# Patient Record
Sex: FEMALE | Race: ASIAN | NOT HISPANIC OR LATINO | URBAN - METROPOLITAN AREA
[De-identification: names, ages, dates, MRNs, and addresses within clinical notes are randomized per-mention and may not be internally consistent; named-entity substitution may affect disease eponyms.]

---

## 2020-06-15 ENCOUNTER — EMERGENCY (EMERGENCY)
Facility: HOSPITAL | Age: 20
LOS: 1 days | Discharge: HOME | End: 2020-06-15
Attending: EMERGENCY MEDICINE | Admitting: EMERGENCY MEDICINE
Payer: MEDICAID

## 2020-06-15 VITALS
SYSTOLIC BLOOD PRESSURE: 100 MMHG | RESPIRATION RATE: 18 BRPM | HEART RATE: 76 BPM | OXYGEN SATURATION: 100 % | DIASTOLIC BLOOD PRESSURE: 68 MMHG

## 2020-06-15 VITALS
HEART RATE: 91 BPM | DIASTOLIC BLOOD PRESSURE: 79 MMHG | RESPIRATION RATE: 18 BRPM | SYSTOLIC BLOOD PRESSURE: 114 MMHG | OXYGEN SATURATION: 100 % | TEMPERATURE: 99 F

## 2020-06-15 LAB
ALBUMIN SERPL ELPH-MCNC: 4.3 G/DL — SIGNIFICANT CHANGE UP (ref 3.3–5)
ALP SERPL-CCNC: 80 U/L — SIGNIFICANT CHANGE UP (ref 40–120)
ALT FLD-CCNC: 13 U/L — SIGNIFICANT CHANGE UP (ref 4–33)
ANION GAP SERPL CALC-SCNC: 12 MMO/L — SIGNIFICANT CHANGE UP (ref 7–14)
APPEARANCE UR: CLEAR — SIGNIFICANT CHANGE UP
AST SERPL-CCNC: 42 U/L — HIGH (ref 4–32)
BACTERIA # UR AUTO: SIGNIFICANT CHANGE UP
BASOPHILS # BLD AUTO: 0.04 K/UL — SIGNIFICANT CHANGE UP (ref 0–0.2)
BASOPHILS NFR BLD AUTO: 0.7 % — SIGNIFICANT CHANGE UP (ref 0–2)
BILIRUB SERPL-MCNC: 0.2 MG/DL — SIGNIFICANT CHANGE UP (ref 0.2–1.2)
BILIRUB UR-MCNC: NEGATIVE — SIGNIFICANT CHANGE UP
BLOOD UR QL VISUAL: SIGNIFICANT CHANGE UP
BUN SERPL-MCNC: 12 MG/DL — SIGNIFICANT CHANGE UP (ref 7–23)
CALCIUM SERPL-MCNC: 9.1 MG/DL — SIGNIFICANT CHANGE UP (ref 8.4–10.5)
CHLORIDE SERPL-SCNC: 103 MMOL/L — SIGNIFICANT CHANGE UP (ref 98–107)
CO2 SERPL-SCNC: 20 MMOL/L — LOW (ref 22–31)
COLOR SPEC: SIGNIFICANT CHANGE UP
CREAT SERPL-MCNC: 0.65 MG/DL — SIGNIFICANT CHANGE UP (ref 0.5–1.3)
EOSINOPHIL # BLD AUTO: 0.13 K/UL — SIGNIFICANT CHANGE UP (ref 0–0.5)
EOSINOPHIL NFR BLD AUTO: 2.2 % — SIGNIFICANT CHANGE UP (ref 0–6)
GLUCOSE SERPL-MCNC: 91 MG/DL — SIGNIFICANT CHANGE UP (ref 70–99)
GLUCOSE UR-MCNC: NEGATIVE — SIGNIFICANT CHANGE UP
HCT VFR BLD CALC: 43.7 % — SIGNIFICANT CHANGE UP (ref 34.5–45)
HGB BLD-MCNC: 13.5 G/DL — SIGNIFICANT CHANGE UP (ref 11.5–15.5)
HYALINE CASTS # UR AUTO: NEGATIVE — SIGNIFICANT CHANGE UP
IMM GRANULOCYTES NFR BLD AUTO: 0.3 % — SIGNIFICANT CHANGE UP (ref 0–1.5)
KETONES UR-MCNC: NEGATIVE — SIGNIFICANT CHANGE UP
LEUKOCYTE ESTERASE UR-ACNC: SIGNIFICANT CHANGE UP
LYMPHOCYTES # BLD AUTO: 2.12 K/UL — SIGNIFICANT CHANGE UP (ref 1–3.3)
LYMPHOCYTES # BLD AUTO: 36.7 % — SIGNIFICANT CHANGE UP (ref 13–44)
MCHC RBC-ENTMCNC: 26.8 PG — LOW (ref 27–34)
MCHC RBC-ENTMCNC: 30.9 % — LOW (ref 32–36)
MCV RBC AUTO: 86.7 FL — SIGNIFICANT CHANGE UP (ref 80–100)
MONOCYTES # BLD AUTO: 0.57 K/UL — SIGNIFICANT CHANGE UP (ref 0–0.9)
MONOCYTES NFR BLD AUTO: 9.9 % — SIGNIFICANT CHANGE UP (ref 2–14)
NEUTROPHILS # BLD AUTO: 2.9 K/UL — SIGNIFICANT CHANGE UP (ref 1.8–7.4)
NEUTROPHILS NFR BLD AUTO: 50.2 % — SIGNIFICANT CHANGE UP (ref 43–77)
NITRITE UR-MCNC: NEGATIVE — SIGNIFICANT CHANGE UP
NRBC # FLD: 0 K/UL — SIGNIFICANT CHANGE UP (ref 0–0)
PH UR: 6.5 — SIGNIFICANT CHANGE UP (ref 5–8)
PLATELET # BLD AUTO: 241 K/UL — SIGNIFICANT CHANGE UP (ref 150–400)
PMV BLD: 10.5 FL — SIGNIFICANT CHANGE UP (ref 7–13)
POTASSIUM SERPL-MCNC: SIGNIFICANT CHANGE UP MMOL/L (ref 3.5–5.3)
POTASSIUM SERPL-SCNC: SIGNIFICANT CHANGE UP MMOL/L (ref 3.5–5.3)
PROT SERPL-MCNC: 7.5 G/DL — SIGNIFICANT CHANGE UP (ref 6–8.3)
PROT UR-MCNC: NEGATIVE — SIGNIFICANT CHANGE UP
RBC # BLD: 5.04 M/UL — SIGNIFICANT CHANGE UP (ref 3.8–5.2)
RBC # FLD: 14.8 % — HIGH (ref 10.3–14.5)
RBC CASTS # UR COMP ASSIST: SIGNIFICANT CHANGE UP (ref 0–?)
SODIUM SERPL-SCNC: 135 MMOL/L — SIGNIFICANT CHANGE UP (ref 135–145)
SP GR SPEC: 1.01 — SIGNIFICANT CHANGE UP (ref 1–1.04)
SQUAMOUS # UR AUTO: SIGNIFICANT CHANGE UP
UROBILINOGEN FLD QL: NORMAL — SIGNIFICANT CHANGE UP
WBC # BLD: 5.78 K/UL — SIGNIFICANT CHANGE UP (ref 3.8–10.5)
WBC # FLD AUTO: 5.78 K/UL — SIGNIFICANT CHANGE UP (ref 3.8–10.5)
WBC UR QL: HIGH (ref 0–?)

## 2020-06-15 PROCEDURE — 76830 TRANSVAGINAL US NON-OB: CPT | Mod: 26

## 2020-06-15 PROCEDURE — 99284 EMERGENCY DEPT VISIT MOD MDM: CPT

## 2020-06-15 RX ORDER — CEFPODOXIME PROXETIL 100 MG
1 TABLET ORAL
Qty: 14 | Refills: 0
Start: 2020-06-15 | End: 2020-06-21

## 2020-06-15 RX ORDER — CEFPODOXIME PROXETIL 100 MG
200 TABLET ORAL ONCE
Refills: 0 | Status: COMPLETED | OUTPATIENT
Start: 2020-06-15 | End: 2020-06-15

## 2020-06-15 RX ADMIN — Medication 200 MILLIGRAM(S): at 23:16

## 2020-06-15 NOTE — ED PROVIDER NOTE - NSFOLLOWUPINSTRUCTIONS_ED_ALL_ED_FT
Follow up with your PMD within 48-72 hours.      Take Cefpodoxime 200 mg 1 tab 2x/day for 7 days.      Increase fluids.     Motrin 600mg every 8 hours with food for pain.     Worsening pain, new fever, chills, nausea, vomiting return to ER

## 2020-06-15 NOTE — ED ADULT NURSE NOTE - OBJECTIVE STATEMENT
19 y/o female presents to ED complaining of generalized lower abdominal pain radiating to the back and dysuria x1 week.  Patient was taking Bactrim for uti with no relief.  +polyuria.  Denies fever, chills.

## 2020-06-15 NOTE — ED PROVIDER NOTE - OBJECTIVE STATEMENT
21 yo F with PMHX of mild CP b/l hip surgery, intersitial cystitis, recurrent UTIs here with c/o lower abdominal pain/ pelvic pain x approx. 2.5 weeks, since LMP 5/28, pt notes she had some suprapubic pain, went to OBGYN was dx with uti, placed on bactrim which she completed reducing her pain from 8/10 to 3/10, but patient developed first left sided rib pain which she thought was from an accidental elbow to her rib, then it migrated to the right rib, with reproduced pain with deep inspiration. Denies new chest pain, sob, vomiting, fever, chills, h/o STDs, vaginal bleeding. 21 yo F with PMHX of mild CP b/l hip surgery, intersitial cystitis, recurrent UTIs here with c/o lower abdominal pain/ pelvic pain x approx. 2.5 weeks, since LMP 5/28, pt notes she had some suprapubic pain, went to OBGYN was dx with uti, placed on bactrim which she completed reducing her pain from 8/10 to 3/10, but patient developed first left sided rib pain which she thought was from an accidental elbow to her rib, then it migrated to the right rib, with reproduced pain with deep inspiration. Pt went to OBGYN who told her to come to the ED for evaluation. Denies new chest pain, sob, vomiting, fever, chills, h/o STDs, vaginal bleeding.

## 2020-06-15 NOTE — ED PROVIDER NOTE - PHYSICAL EXAMINATION
GEN - NAD; well appearing; A+O x3   HEAD - NC/AT   EYES- PERRL, EOMI  ENT: Airway patent, mmm  NECK: Neck supple  PULMONARY - CTA b/l, symmetric breath sounds.   CARDIAC -s1s2, RRR, no M,G,R  ABDOMEN - +BS, ND, mild TTP suprapubic, soft, no guarding, no rebound, no masses   BACK - no CVA tenderness  PELVIC: no CMT, no blood, mild b/l adnexal TTP   EXTREMITIES - FROM, symmetric pulses, capillary refill < 2 seconds, no edema   NEUROLOGIC - alert, speech clear  PSYCH -nl mood/affect, nl insight.

## 2020-06-15 NOTE — ED PROVIDER NOTE - PATIENT PORTAL LINK FT
You can access the FollowMyHealth Patient Portal offered by [facility] by registering at the following website: [email or web address]. By joining [facility]’s FollowMyHealth portal, you will also be able to view your health information using other applications (apps) compatible with our system.

## 2020-06-15 NOTE — ED ADULT TRIAGE NOTE - CHIEF COMPLAINT QUOTE
Pt c/o lower abdominal pain, pelvic pain  and bilateral rib/ back pain since 5/28 accompanied by dysuria on recently finished Bactrim. PMH CP, interstitial cystitis, uti

## 2020-06-15 NOTE — ED PROVIDER NOTE - CLINICAL SUMMARY MEDICAL DECISION MAKING FREE TEXT BOX
19 yo F with PMHX of mild CP b/l hip surgery, intersitial cystitis, recurrent UTIs here with c/o lower abdominal pain/ pelvic pain x approx. 2.5 week, mild suprapubic TTP on exam and some adnexal pain on bimanual. Pending labs, urine, TVUS, reassess.   -MIGUEL

## 2020-06-16 LAB
CULTURE RESULTS: SIGNIFICANT CHANGE UP
SPECIMEN SOURCE: SIGNIFICANT CHANGE UP

## 2020-06-16 RX ORDER — CEPHALEXIN 500 MG
1 CAPSULE ORAL
Qty: 14 | Refills: 0
Start: 2020-06-16 | End: 2020-06-22

## 2020-06-16 NOTE — ED POST DISCHARGE NOTE - RESULT SUMMARY
[facility] pharmacy called they don't have Cefpodoxime and will not be getting for a while. According to pharmacist patient doesn't want to go to a different pharmacy. Switched Cefpodoxime to Keflex 500mg BID X 7 days.

## 2020-06-25 PROBLEM — Z00.00 ENCOUNTER FOR PREVENTIVE HEALTH EXAMINATION: Status: ACTIVE | Noted: 2020-06-25

## 2020-07-01 ENCOUNTER — APPOINTMENT (OUTPATIENT)
Dept: UROLOGY | Facility: CLINIC | Age: 20
End: 2020-07-01
Payer: MEDICAID

## 2020-07-01 VITALS
DIASTOLIC BLOOD PRESSURE: 72 MMHG | SYSTOLIC BLOOD PRESSURE: 108 MMHG | RESPIRATION RATE: 14 BRPM | TEMPERATURE: 98.1 F | HEART RATE: 78 BPM

## 2020-07-01 DIAGNOSIS — Z80.0 FAMILY HISTORY OF MALIGNANT NEOPLASM OF DIGESTIVE ORGANS: ICD-10-CM

## 2020-07-01 DIAGNOSIS — Z86.69 PERSONAL HISTORY OF OTHER DISEASES OF THE NERVOUS SYSTEM AND SENSE ORGANS: ICD-10-CM

## 2020-07-01 DIAGNOSIS — Z72.89 OTHER PROBLEMS RELATED TO LIFESTYLE: ICD-10-CM

## 2020-07-01 DIAGNOSIS — Z78.9 OTHER SPECIFIED HEALTH STATUS: ICD-10-CM

## 2020-07-01 PROCEDURE — 99203 OFFICE O/P NEW LOW 30 MIN: CPT | Mod: 25

## 2020-07-01 PROCEDURE — 51701 INSERT BLADDER CATHETER: CPT

## 2020-07-01 RX ORDER — LEVONORGESTREL AND ETHINYL ESTRADIOL 0.1-0.02MG
KIT ORAL
Refills: 0 | Status: ACTIVE | COMMUNITY

## 2020-07-01 RX ORDER — SULFAMETHOXAZOLE AND TRIMETHOPRIM 400; 80 MG/1; MG/1
400-80 TABLET ORAL
Qty: 60 | Refills: 2 | Status: ACTIVE | COMMUNITY
Start: 2020-07-01 | End: 1900-01-01

## 2020-07-01 NOTE — REVIEW OF SYSTEMS
[Chest Pain] : chest pain [Painful [place]] : painful [place] [Urine Infection (bladder/kidney)] : bladder/kidney infection [Negative] : Heme/Lymph

## 2020-07-02 LAB
APPEARANCE: CLEAR
BACTERIA: NEGATIVE
BILIRUBIN URINE: NEGATIVE
BLOOD URINE: NEGATIVE
COLOR: COLORLESS
GLUCOSE QUALITATIVE U: NEGATIVE
HYALINE CASTS: 0 /LPF
KETONES URINE: NEGATIVE
LEUKOCYTE ESTERASE URINE: NEGATIVE
MICROSCOPIC-UA: NORMAL
NITRITE URINE: NEGATIVE
PH URINE: 6.5
PROTEIN URINE: NEGATIVE
RED BLOOD CELLS URINE: 2 /HPF
SPECIFIC GRAVITY URINE: 1.01
SQUAMOUS EPITHELIAL CELLS: 0 /HPF
UROBILINOGEN URINE: NORMAL
WHITE BLOOD CELLS URINE: 0 /HPF

## 2020-07-02 NOTE — ASSESSMENT
[FreeTextEntry1] : We had a lengthy discussion regarding rick urethral normal jeaneth, as well as different jeaneth around the vagina, anus, skin, and in the mouth.  She understands that with manual, oral or penetrative sexual relations, normal colonizers can ascend up the urethra into bladder, even transiently, where symptoms may occur, though the patient will void them out and have a negative culture. For this, it is much safer to take a low dose prophylactic antibiotic. She should continue to hydrate well to urinate frequently, and avoid holding her urine for extended periods of time. When and if symptoms of pressure, burning or bladder discomfort occur, she can take an OTC med such as Azo or Cystex. Maintaining normal bowels is important as well, and in those who have constipation, and even in those without, I recommend a daily probiotic and a healthy diet filled with fruits and vegetables, as well as fiber rich foods.\par \par In terms of her pelvic pain, it is quite possible muscular in nature or even neurogenic from her altered posture and her underlying bony abnormalities. For patients with chronic pelvic pain, I often suggest pelvic floor therapy for which a referral was provided.\par \par We also catheterized her for a sterile specimen as we know that contaminated cultures on often negative on the properly obtained urine spec.\par

## 2020-07-02 NOTE — LETTER BODY
[Dear  ___] : Dear  [unfilled], [Please see my note below.] : Please see my note below. [Consult Letter:] : I had the pleasure of evaluating your patient, [unfilled]. [DrTha  ___] : Dr. STRAUSS [Consult Closing:] : Thank you very much for allowing me to participate in the care of this patient.  If you have any questions, please do not hesitate to contact me. [FreeTextEntry3] : Sincerely,\par \par Marii Weber MD\par [facility]\par

## 2020-07-02 NOTE — HISTORY OF PRESENT ILLNESS
[FreeTextEntry1] : SAMAN ROQUE is a 20 year old F who presents with c/o pelvic pain and dysuria. Had BV recently and was treated and today is not on antibiotics. There is no h/o GH, stones or childhood, febrile or complicated UTI. This feeling with frequent symptoms of what seem to be a UTI, have been going on since she became sexually active. Also, of note and of importance, is that there is left inguinal pain which has been present for many years, even prior to sexual activity. She does have CP and had an 8 hr surgery in 2006 at 7 yo for b/l hip procedures and lengthening or stretching of the hamstrings or quads.  She denies constipation or pain w intercourse.\par \par Apparently, PAP smear has been OK and she does well on an OCP prescribed to regulate her periods, which she still has normally.  With intercourse, she feels pressure, bloating and need or sensation to void more frequently with lesser amounts. There was a cx with 50K coag neg staph on 6/15/20, which is a colonizer with no rosi pyuria but LE positive on dip.

## 2020-07-02 NOTE — PHYSICAL EXAM
[General Appearance - Well Developed] : well developed [General Appearance - Well Nourished] : well nourished [Normal Appearance] : normal appearance [Well Groomed] : well groomed [General Appearance - In No Acute Distress] : no acute distress [Edema] : no peripheral edema [Respiration, Rhythm And Depth] : normal respiratory rhythm and effort [Exaggerated Use Of Accessory Muscles For Inspiration] : no accessory muscle use [Abdomen Soft] : soft [Abdomen Tenderness] : non-tender [Costovertebral Angle Tenderness] : no ~M costovertebral angle tenderness [Normal Station and Gait] : the gait and station were normal for the patient's age [Urinary Bladder Findings] : the bladder was normal on palpation [Oriented To Time, Place, And Person] : oriented to person, place, and time [No Focal Deficits] : no focal deficits [] : no rash [Affect] : the affect was normal [Not Anxious] : not anxious [Mood] : the mood was normal [No Palpable Adenopathy] : no palpable adenopathy

## 2020-07-06 LAB — BACTERIA UR CULT: NORMAL

## 2020-07-18 RX ORDER — SULFAMETHOXAZOLE AND TRIMETHOPRIM 400; 80 MG/1; MG/1
400-80 TABLET ORAL
Qty: 60 | Refills: 5 | Status: ACTIVE | COMMUNITY
Start: 2020-07-18 | End: 1900-01-01

## 2020-11-25 ENCOUNTER — TRANSCRIPTION ENCOUNTER (OUTPATIENT)
Age: 20
End: 2020-11-25

## 2020-11-25 ENCOUNTER — APPOINTMENT (OUTPATIENT)
Dept: UROLOGY | Facility: CLINIC | Age: 20
End: 2020-11-25
Payer: MEDICAID

## 2020-11-25 VITALS
SYSTOLIC BLOOD PRESSURE: 117 MMHG | HEART RATE: 87 BPM | RESPIRATION RATE: 15 BRPM | TEMPERATURE: 96 F | DIASTOLIC BLOOD PRESSURE: 85 MMHG

## 2020-11-25 DIAGNOSIS — R39.9 UNSPECIFIED SYMPTOMS AND SIGNS INVOLVING THE GENITOURINARY SYSTEM: ICD-10-CM

## 2020-11-25 PROCEDURE — 99213 OFFICE O/P EST LOW 20 MIN: CPT | Mod: 25

## 2020-11-25 PROCEDURE — 51701 INSERT BLADDER CATHETER: CPT

## 2020-11-25 RX ORDER — NITROFURANTOIN MACROCRYSTALS 50 MG/1
50 CAPSULE ORAL
Qty: 24 | Refills: 0 | Status: ACTIVE | COMMUNITY
Start: 2020-11-25 | End: 1900-01-01

## 2020-11-25 RX ORDER — SULFAMETHOXAZOLE AND TRIMETHOPRIM 400; 80 MG/1; MG/1
400-80 TABLET ORAL
Qty: 60 | Refills: 2 | Status: ACTIVE | COMMUNITY
Start: 2020-11-25 | End: 1900-01-01

## 2020-11-26 LAB
APPEARANCE: CLEAR
BACTERIA: NEGATIVE
BILIRUBIN URINE: NEGATIVE
BLOOD URINE: ABNORMAL
COLOR: NORMAL
GLUCOSE QUALITATIVE U: NEGATIVE
HYALINE CASTS: 1 /LPF
KETONES URINE: NORMAL
LEUKOCYTE ESTERASE URINE: NEGATIVE
MICROSCOPIC-UA: NORMAL
NITRITE URINE: NEGATIVE
PH URINE: 6.5
PROTEIN URINE: NORMAL
RED BLOOD CELLS URINE: 2 /HPF
SPECIFIC GRAVITY URINE: 1.03
SQUAMOUS EPITHELIAL CELLS: 1 /HPF
UROBILINOGEN URINE: NORMAL
WHITE BLOOD CELLS URINE: 0 /HPF

## 2020-11-27 LAB — BACTERIA UR CULT: NORMAL

## 2020-12-07 NOTE — HISTORY OF PRESENT ILLNESS
[FreeTextEntry1] : SAMAN ROQUE is a 20 year old F who presents with a constant urge to void. She is not on abx today but has been treated with 2 rounds of abx of abx: Nitrofurantoin and CIpro in 10/20 and 11/20.\par \par Now with better bowels than last visit. Taking slimfast and eating better.

## 2020-12-07 NOTE — ASSESSMENT
[FreeTextEntry1] : D/W pt relaxation techniques and pelvic therapy. Also, d/w pt hydration, post coital abx, contaminants with lack of pyuria.  She still wants to understand why antibiotics make her feel better if she doesn't have a UTI. I tried explaining the difference btwn Sx's of UTI from bacilluria, contaminants after intercourse etc. vs a true active, inflammatory acute UTI.\par \par She should start taking OTC meds as soon as sx's start for a few days and keep her post coital abx. \par

## 2021-01-05 DIAGNOSIS — M25.559 PAIN IN UNSPECIFIED HIP: ICD-10-CM

## 2021-01-06 ENCOUNTER — APPOINTMENT (OUTPATIENT)
Dept: ORTHOPEDIC SURGERY | Facility: CLINIC | Age: 21
End: 2021-01-06
Payer: MEDICAID

## 2021-01-06 VITALS — HEIGHT: 59 IN | BODY MASS INDEX: 22.18 KG/M2 | WEIGHT: 110 LBS

## 2021-01-06 DIAGNOSIS — M70.62 TROCHANTERIC BURSITIS, LEFT HIP: ICD-10-CM

## 2021-01-06 DIAGNOSIS — M70.61 TROCHANTERIC BURSITIS, RIGHT HIP: ICD-10-CM

## 2021-01-06 DIAGNOSIS — T84.84XA PAIN DUE TO INTERNAL ORTHOPEDIC PROSTHETIC DEVICES, IMPLANTS AND GRAFTS, INITIAL ENCOUNTER: ICD-10-CM

## 2021-01-06 PROCEDURE — 73522 X-RAY EXAM HIPS BI 3-4 VIEWS: CPT

## 2021-01-06 PROCEDURE — 99204 OFFICE O/P NEW MOD 45 MIN: CPT

## 2021-01-06 PROCEDURE — 99072 ADDL SUPL MATRL&STAF TM PHE: CPT

## 2021-01-06 RX ORDER — DICLOFENAC SODIUM 50 MG/1
50 TABLET, DELAYED RELEASE ORAL TWICE DAILY
Qty: 60 | Refills: 0 | Status: ACTIVE | COMMUNITY
Start: 2021-01-06 | End: 1900-01-01

## 2021-01-06 NOTE — PHYSICAL EXAM
[de-identified] : Physical Examination\par General: well nourished, in no acute distress, alert and oriented to person, place and time\par Psychiatric: normal mood and affect, no abnormal movements or speech patterns\par Eyes: vision intact + glasses\par Throat: no thyromegaly\par Lymph: no enlarged nodes, no lymphedema in extremity\par Respiratory: no wheezing, no shortness of breath with ambulation\par Cardiac: no cardiac leg swelling, 2+ peripheral pulses\par Neurology: normal gross sensation in extremities to light touch\par Abdomen: soft, non-tender, tympanic, no masses\par \par Musculoskeletal Examination\par Ambulation	+ antalgic gait, - assistive devices\par \par Hip			Right			Left\par General\par      Swelling/Deformity	internal rotation at the hip			internal rotation at the hip\par      Skin			lateral incision			lateral incision\par      Erythema		-			-\par      Standing Alignment	neutral			neutral\par Range of Motion\par      Flexion		90			90\par      Abduction		20			20\par      Flex ER		45			45\par      Flex IR		15			15\par      Knee        		0 - 130			0 - 130\par Provocative Exam\par      Log Roll		-			-\par      Heel Strike		-			-\par      Fig 4			-			-\par      SLR			-			-\par Palpation\par      Public Symphysis	-			-\par      Groin   	 	-			-\par      Greater Trochanter	+			+\par      Piriformis		-			-\par      SI Joint		-			-\par Strength Examination/Atrophy\par      Hip Flexor		5+			5+\par      Quadriceps		4+			4+\par      Hamstring		3+			3+\par hamstrings sp lengthening at distal aspect\par Sensation\par      Deep Peroneal        	normal			normal\par      Superficial Peroneal 	normal			normal\par      Sural  	 	normal			normal\par      Posterior Tibial        	normal			normal\par      Saphenous		normal			normal\par Pulse\par      DP			2+			2+\par  [de-identified] : 4 views of the affected bilateral hip (AP and Frog Lateral and 45/90 Baltazar views)\par were ordered, taken and evaluated by myself today and \par demonstrate:\par Right\par [normal] acetabular morphology\par [normal] femoral head neck morphology\par no acetabular osteophytes\par no asymmetric joint space loss\par [Spherical] femoral head\par varus angulation at the subtroch region\par DHS placed inferior and slightly anterior\par chronic bone changes on shaft screws, unknown if related to prior surgeries\par \par Left\par [normal] acetabular morphology\par [normal] femoral head neck morphology\par no acetabular osteophytes\par no asymmetric joint space loss\par [Spherical] femoral head\par DHS screw anterior inferior possible at or in anterior inferior neck\par less varus angulation at the subtroch region

## 2021-01-06 NOTE — DISCUSSION/SUMMARY
[de-identified] : Bilateral hip sp possible valgus or rotational osteotomy\par painful hardware with trochanteric bursitis\par \par I discussed my findings and history exam and radiology\par \par Physical therapy\par \par The patient was prescribed Diclofenac PO non-steroidal anti-inflammatory medication. 50mg tablets twice daily to be taken for at least 1-2 weeks in a row and then PRN afterwards. Risks and benefits were discussed and include but not limited to renal damage and GI ulceration and bleeding.  They were advised to take with food to limit stomach upset as well as warned to stop the medication if worsening gastric pain or dizziness or other side effects. Also to immediately stop the medication and seek appropriate medical attention if any severe stomach ache, gastritis, black/red vomit, black/red stools or any other medical concern.\par \par \par Discussed the benefits risks and alternatives of diagnostic and therapeutic corticosteroid injection with severe complication of infection and removal of hardware\par \par Patient was told by primary surgeon that removal hardware is not advised to do possible fracture\par \par X-rays show possible violation of the anterior inferior femoral neck on the left by the cephallomedullary screw\par pain is related to the proud hardware\par \par discussed options, recommend obtaining surgery records, see neurology for bracing which she used previously\par \par activity modification\par \par heat\par \par fu prn

## 2021-01-06 NOTE — HISTORY OF PRESENT ILLNESS
[de-identified] : CC RIGHT and LEFT hip\par \par HPI 20 year yo, with pmhx of Cerebral Palsy and hx of bilateral hip surgery 2006 by Dr. Madhu Delacruz,  F presents with gradual onset of 15 years of intermittent pain in the lateral BL hips without Injury (R>L). The pain is worse and rated a 8 out of 10 in the cold months, described as sharp, without radiation. Nothing makes the pain better and everything makes the pain worse. The patient reports associated symptoms numbess in her BL feet which began 6mo ago. The patient REPORTS similar pain previously. Pt reports developing a limp after her surgery. \par \par Pt is not seeing anyone for CP. \par \par Previous treatments include:\par Activity Modification	+\par Ice/Compression 	-\par NSAIDs  		-\par Physical Therapy 	+ [facility] in 2006 after surgery\par Cortisone Injection	-\par Surgery  		+ 2006 BL hip surgery, pt unsure what of what procedure, surgeon since retired, reportedly due to internal rotation of legs\par \par Review of Systems is positive for the above musculoskeletal symptoms and is otherwise non-contributory for general, constitutional, psychiatric, neurologic, HEENT, cardiac, respiratory, gastrointestinal, reproductive, lymphatic, and dermatologic complaints.\par \par Consult By Dr kunz \tahir

## 2021-02-03 ENCOUNTER — APPOINTMENT (OUTPATIENT)
Dept: NEUROLOGY | Facility: CLINIC | Age: 21
End: 2021-02-03
Payer: MEDICAID

## 2021-02-03 VITALS
HEIGHT: 59 IN | DIASTOLIC BLOOD PRESSURE: 73 MMHG | WEIGHT: 111 LBS | TEMPERATURE: 98.4 F | SYSTOLIC BLOOD PRESSURE: 109 MMHG | HEART RATE: 122 BPM | BODY MASS INDEX: 22.38 KG/M2 | OXYGEN SATURATION: 98 %

## 2021-02-03 DIAGNOSIS — Z83.3 FAMILY HISTORY OF DIABETES MELLITUS: ICD-10-CM

## 2021-02-03 DIAGNOSIS — Z86.69 PERSONAL HISTORY OF OTHER DISEASES OF THE NERVOUS SYSTEM AND SENSE ORGANS: ICD-10-CM

## 2021-02-03 PROCEDURE — 99205 OFFICE O/P NEW HI 60 MIN: CPT

## 2021-02-03 PROCEDURE — 99072 ADDL SUPL MATRL&STAF TM PHE: CPT

## 2021-02-03 RX ORDER — NORETHINDRONE ACETATE/ETHINYL ESTRADIOL AND FERROUS FUMARATE 1MG-20(24)
KIT ORAL
Refills: 0 | Status: ACTIVE | COMMUNITY

## 2021-02-03 NOTE — REVIEW OF SYSTEMS
[As Noted in HPI] : as noted in HPI [Dysuria] : dysuria [Fever] : no fever [Anxiety] : no anxiety [Eyesight Problems] : no eyesight problems [Loss Of Hearing] : no hearing loss [Chest Pain] : no chest pain [Cough] : no cough [Vomiting] : no vomiting [Joint Pain] : no joint pain [Itching] : no itching [Muscle Weakness] : no muscle weakness [Easy Bleeding] : no tendency for easy bleeding

## 2021-02-03 NOTE — DATA REVIEWED
[de-identified] : outside medical records noted\par Glu 91\par EXAM: XR HIP WITH PELV 2-3V RT \par PROCEDURE DATE: Sep 24 2016 \par INTERPRETATION: CLINICAL INDICATION: CT; right hip pain; prior bilateral \par hip orthopedic instrumentation; evaluate for dislocation \par TECHNIQUE: Frontal view of the pelvis and 2 views of the right hip \par COMPARISON: None available \par IMPRESSION: Intact and uncomplicated bilateral proximal femoral dynamic \par compression screws with sideplates and cortical fixation screws. \par No dislocations or acute appearing fractures. \par Intact pelvic and obturator rings and symmetrically aligned spaced SI joints \par and pubic symphysis. \par Preserved bilateral hip joint spaces and no gross radiographic evidence for \par AVN. \par No discrete lytic or blastic lesions. \par NATHAN BRANDT M.D., RADIOLOGY RESIDENT \par This document has been electronically signed. \par MATTHIEU DEJESUS M.D., ATTENDING RADIOLOGIST \par This document has been electronically signed. Sep 24 2016 9:31AM

## 2021-02-03 NOTE — CONSULT LETTER
[Dear  ___] : Dear  [unfilled], [Consult Letter:] : I had the pleasure of evaluating your patient, [unfilled]. [Please see my note below.] : Please see my note below. [Consult Closing:] : Thank you very much for allowing me to participate in the care of this patient.  If you have any questions, please do not hesitate to contact me. [Sincerely,] : Sincerely, [FreeTextEntry3] : Nena Potter MD, MPH\par

## 2021-02-03 NOTE — ASSESSMENT
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  Will get MRI brain and refer to PT again.  Utility of botox injection in the legs was dw patient.  The patient has been encouraged to the relaxation techniques and pelvic therapy recommended by urology.  The patient also has lordosis, will refer to ortho spine for eval.

## 2021-02-03 NOTE — PHYSICAL EXAM
[General Appearance - Alert] : alert [General Appearance - In No Acute Distress] : in no acute distress [Place] : oriented to place [Person] : oriented to person [Time] : oriented to time [Registration Intact] : recent registration memory intact [Concentration Intact] : normal concentrating ability [Visual Intact] : visual attention was ~T not ~L decreased [Naming Objects] : no difficulty naming common objects [Fluency] : fluency intact [Comprehension] : comprehension intact [Vocabulary] : adequate range of vocabulary [Cranial Nerves Optic (II)] : visual acuity intact bilaterally,  visual fields full to confrontation, pupils equal round and reactive to light [Cranial Nerves Oculomotor (III)] : extraocular motion intact [Cranial Nerves Trigeminal (V)] : facial sensation intact symmetrically [Cranial Nerves Facial (VII)] : face symmetrical [Cranial Nerves Vestibulocochlear (VIII)] : hearing was intact bilaterally [Cranial Nerves Glossopharyngeal (IX)] : tongue and palate midline [Cranial Nerves Accessory (XI - Cranial And Spinal)] : head turning and shoulder shrug symmetric [Cranial Nerves Hypoglossal (XII)] : there was no tongue deviation with protrusion [Involuntary Movements] : no involuntary movements were seen [Sensation Tactile Decrease] : light touch was intact [3+] : Patella left 3+ [2+] : Ankle jerk left 2+ [Coordination - Dysmetria Impaired Finger-to-Nose Bilateral] : not present [Coordination - Dysmetria Impaired Heel-to-Shin Bilateral] : not present [Plantar Reflex Right Only] : normal on the right [Plantar Reflex Left Only] : normal on the left [FreeTextEntry6] : spastic legs bl [FreeTextEntry8] : Spastic [FreeTextEntry1] : +lordosis

## 2021-02-03 NOTE — HISTORY OF PRESENT ILLNESS
[FreeTextEntry1] : The patient has cerebral palsy s/p  bl femoral de-rotation osteotomies, hamstring and adductor release and here to establish care.  The patient is able to ambulate but did not play sports normally as a child.  She has some spasticity  in the legs and difficulty with ambulation, not significantly progressed.  No symptoms in the arms.  No falls.  She has frequent UTIs and saw urology, she was given pelvis and relaxation therapy but has not yet done so.  She started PT for the spasticity.

## 2021-02-10 ENCOUNTER — APPOINTMENT (OUTPATIENT)
Dept: ORTHOPEDIC SURGERY | Facility: CLINIC | Age: 21
End: 2021-02-10

## 2021-03-24 ENCOUNTER — APPOINTMENT (OUTPATIENT)
Dept: NEUROLOGY | Facility: CLINIC | Age: 21
End: 2021-03-24
Payer: MEDICAID

## 2021-03-24 VITALS
WEIGHT: 111 LBS | DIASTOLIC BLOOD PRESSURE: 68 MMHG | OXYGEN SATURATION: 99 % | TEMPERATURE: 97.9 F | HEART RATE: 98 BPM | SYSTOLIC BLOOD PRESSURE: 112 MMHG | BODY MASS INDEX: 22.38 KG/M2 | HEIGHT: 59 IN

## 2021-03-24 DIAGNOSIS — G80.9 CEREBRAL PALSY, UNSPECIFIED: ICD-10-CM

## 2021-03-24 DIAGNOSIS — G80.1 SPASTIC DIPLEGIC CEREBRAL PALSY: ICD-10-CM

## 2021-03-24 DIAGNOSIS — R30.0 DYSURIA: ICD-10-CM

## 2021-03-24 DIAGNOSIS — R10.2 PELVIC AND PERINEAL PAIN: ICD-10-CM

## 2021-03-24 PROCEDURE — 99213 OFFICE O/P EST LOW 20 MIN: CPT

## 2021-03-24 PROCEDURE — 99072 ADDL SUPL MATRL&STAF TM PHE: CPT

## 2021-03-24 NOTE — PHYSICAL EXAM
[General Appearance - Alert] : alert [General Appearance - In No Acute Distress] : in no acute distress [FreeTextEntry6] : spastic leg, pelvic tilt and lordosis

## 2021-03-24 NOTE — HISTORY OF PRESENT ILLNESS
[FreeTextEntry1] : The patient has cerebral palsy s/p bl femoral de-rotation osteotomies, hamstring and adductor release and here to establish care. The patient is able to ambulate but did not play sports normally as a child. She has some spasticity in the legs and difficulty with ambulation, not significantly progressed. No symptoms in the arms. No falls. She has frequent UTIs and saw urology, she was given pelvis and relaxation therapy but has not yet done so. She started PT for the spasticity with some improvement of symptoms.\par

## 2021-03-24 NOTE — ASSESSMENT
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 cw PT.  Utility of botox injection in the legs was dw patient, patient is not interested at this time. The patient has been encouraged to the relaxation techniques and pelvic therapy recommended by urology. The patient also has lordosis, will fu ortho spine for eval. \par

## 2021-10-26 ENCOUNTER — EMERGENCY (EMERGENCY)
Facility: HOSPITAL | Age: 21
LOS: 1 days | Discharge: HOME | End: 2021-10-26
Attending: STUDENT IN AN ORGANIZED HEALTH CARE EDUCATION/TRAINING PROGRAM | Admitting: EMERGENCY MEDICINE
Payer: MEDICAID

## 2021-10-26 VITALS
OXYGEN SATURATION: 99 % | RESPIRATION RATE: 18 BRPM | SYSTOLIC BLOOD PRESSURE: 104 MMHG | DIASTOLIC BLOOD PRESSURE: 58 MMHG | HEART RATE: 79 BPM | TEMPERATURE: 99 F

## 2021-10-26 DIAGNOSIS — R10.9 UNSPECIFIED ABDOMINAL PAIN: ICD-10-CM

## 2021-10-26 LAB
ALBUMIN SERPL ELPH-MCNC: 4.2 G/DL — SIGNIFICANT CHANGE UP (ref 3.3–5)
ALP SERPL-CCNC: 88 U/L — SIGNIFICANT CHANGE UP (ref 40–120)
ALT FLD-CCNC: 22 U/L — SIGNIFICANT CHANGE UP (ref 4–33)
ANION GAP SERPL CALC-SCNC: 15 MMOL/L — HIGH (ref 7–14)
APPEARANCE UR: CLEAR — SIGNIFICANT CHANGE UP
AST SERPL-CCNC: 21 U/L — SIGNIFICANT CHANGE UP (ref 4–32)
BACTERIA # UR AUTO: NEGATIVE — SIGNIFICANT CHANGE UP
BASOPHILS # BLD AUTO: 0.02 K/UL — SIGNIFICANT CHANGE UP (ref 0–0.2)
BASOPHILS NFR BLD AUTO: 0.1 % — SIGNIFICANT CHANGE UP (ref 0–2)
BILIRUB SERPL-MCNC: 0.5 MG/DL — SIGNIFICANT CHANGE UP (ref 0.2–1.2)
BILIRUB UR-MCNC: NEGATIVE — SIGNIFICANT CHANGE UP
BLD GP AB SCN SERPL QL: NEGATIVE — SIGNIFICANT CHANGE UP
BUN SERPL-MCNC: 12 MG/DL — SIGNIFICANT CHANGE UP (ref 7–23)
CALCIUM SERPL-MCNC: 8.9 MG/DL — SIGNIFICANT CHANGE UP (ref 8.4–10.5)
CHLORIDE SERPL-SCNC: 108 MMOL/L — HIGH (ref 98–107)
CO2 SERPL-SCNC: 16 MMOL/L — LOW (ref 22–31)
COLOR SPEC: YELLOW — SIGNIFICANT CHANGE UP
CREAT SERPL-MCNC: 0.56 MG/DL — SIGNIFICANT CHANGE UP (ref 0.5–1.3)
DIFF PNL FLD: NEGATIVE — SIGNIFICANT CHANGE UP
EOSINOPHIL # BLD AUTO: 0.03 K/UL — SIGNIFICANT CHANGE UP (ref 0–0.5)
EOSINOPHIL NFR BLD AUTO: 0.2 % — SIGNIFICANT CHANGE UP (ref 0–6)
EPI CELLS # UR: 6 /HPF — HIGH (ref 0–5)
FLUAV AG NPH QL: SIGNIFICANT CHANGE UP
FLUBV AG NPH QL: SIGNIFICANT CHANGE UP
GLUCOSE SERPL-MCNC: 84 MG/DL — SIGNIFICANT CHANGE UP (ref 70–99)
GLUCOSE UR QL: NEGATIVE — SIGNIFICANT CHANGE UP
HCG SERPL-ACNC: <5 MIU/ML — SIGNIFICANT CHANGE UP
HCT VFR BLD CALC: 43.1 % — SIGNIFICANT CHANGE UP (ref 34.5–45)
HGB BLD-MCNC: 13.4 G/DL — SIGNIFICANT CHANGE UP (ref 11.5–15.5)
HIV 1+2 AB+HIV1 P24 AG SERPL QL IA: SIGNIFICANT CHANGE UP
HYALINE CASTS # UR AUTO: 0 /LPF — SIGNIFICANT CHANGE UP (ref 0–7)
IANC: 12.63 K/UL — HIGH (ref 1.5–8.5)
IMM GRANULOCYTES NFR BLD AUTO: 0.4 % — SIGNIFICANT CHANGE UP (ref 0–1.5)
KETONES UR-MCNC: ABNORMAL
LEUKOCYTE ESTERASE UR-ACNC: NEGATIVE — SIGNIFICANT CHANGE UP
LIDOCAIN IGE QN: 32 U/L — SIGNIFICANT CHANGE UP (ref 7–60)
LYMPHOCYTES # BLD AUTO: 1.75 K/UL — SIGNIFICANT CHANGE UP (ref 1–3.3)
LYMPHOCYTES # BLD AUTO: 11.5 % — LOW (ref 13–44)
MCHC RBC-ENTMCNC: 28.3 PG — SIGNIFICANT CHANGE UP (ref 27–34)
MCHC RBC-ENTMCNC: 31.1 GM/DL — LOW (ref 32–36)
MCV RBC AUTO: 91.1 FL — SIGNIFICANT CHANGE UP (ref 80–100)
MONOCYTES # BLD AUTO: 0.77 K/UL — SIGNIFICANT CHANGE UP (ref 0–0.9)
MONOCYTES NFR BLD AUTO: 5 % — SIGNIFICANT CHANGE UP (ref 2–14)
NEUTROPHILS # BLD AUTO: 12.63 K/UL — HIGH (ref 1.8–7.4)
NEUTROPHILS NFR BLD AUTO: 82.8 % — HIGH (ref 43–77)
NITRITE UR-MCNC: NEGATIVE — SIGNIFICANT CHANGE UP
NRBC # BLD: 0 /100 WBCS — SIGNIFICANT CHANGE UP
NRBC # FLD: 0 K/UL — SIGNIFICANT CHANGE UP
PH UR: 7 — SIGNIFICANT CHANGE UP (ref 5–8)
PLATELET # BLD AUTO: 224 K/UL — SIGNIFICANT CHANGE UP (ref 150–400)
POTASSIUM SERPL-MCNC: 4.6 MMOL/L — SIGNIFICANT CHANGE UP (ref 3.5–5.3)
POTASSIUM SERPL-SCNC: 4.6 MMOL/L — SIGNIFICANT CHANGE UP (ref 3.5–5.3)
PROT SERPL-MCNC: 6.8 G/DL — SIGNIFICANT CHANGE UP (ref 6–8.3)
PROT UR-MCNC: ABNORMAL
RBC # BLD: 4.73 M/UL — SIGNIFICANT CHANGE UP (ref 3.8–5.2)
RBC # FLD: 14.7 % — HIGH (ref 10.3–14.5)
RBC CASTS # UR COMP ASSIST: 3 /HPF — SIGNIFICANT CHANGE UP (ref 0–4)
RH IG SCN BLD-IMP: POSITIVE — SIGNIFICANT CHANGE UP
RSV RNA NPH QL NAA+NON-PROBE: SIGNIFICANT CHANGE UP
SARS-COV-2 RNA SPEC QL NAA+PROBE: SIGNIFICANT CHANGE UP
SODIUM SERPL-SCNC: 139 MMOL/L — SIGNIFICANT CHANGE UP (ref 135–145)
SP GR SPEC: 1.03 — SIGNIFICANT CHANGE UP (ref 1–1.05)
UROBILINOGEN FLD QL: SIGNIFICANT CHANGE UP
WBC # BLD: 15.26 K/UL — HIGH (ref 3.8–10.5)
WBC # FLD AUTO: 15.26 K/UL — HIGH (ref 3.8–10.5)
WBC UR QL: 3 /HPF — SIGNIFICANT CHANGE UP (ref 0–5)

## 2021-10-26 PROCEDURE — 99285 EMERGENCY DEPT VISIT HI MDM: CPT

## 2021-10-26 PROCEDURE — 76830 TRANSVAGINAL US NON-OB: CPT | Mod: 26

## 2021-10-26 PROCEDURE — 74177 CT ABD & PELVIS W/CONTRAST: CPT | Mod: 26,MA

## 2021-10-26 RX ORDER — ONDANSETRON 8 MG/1
4 TABLET, FILM COATED ORAL ONCE
Refills: 0 | Status: COMPLETED | OUTPATIENT
Start: 2021-10-26 | End: 2021-10-26

## 2021-10-26 RX ORDER — SODIUM CHLORIDE 9 MG/ML
1000 INJECTION INTRAMUSCULAR; INTRAVENOUS; SUBCUTANEOUS ONCE
Refills: 0 | Status: COMPLETED | OUTPATIENT
Start: 2021-10-26 | End: 2021-10-26

## 2021-10-26 RX ORDER — ACETAMINOPHEN 500 MG
975 TABLET ORAL ONCE
Refills: 0 | Status: COMPLETED | OUTPATIENT
Start: 2021-10-26 | End: 2021-10-26

## 2021-10-26 RX ADMIN — Medication 975 MILLIGRAM(S): at 18:44

## 2021-10-26 RX ADMIN — Medication 975 MILLIGRAM(S): at 20:10

## 2021-10-26 RX ADMIN — SODIUM CHLORIDE 1000 MILLILITER(S): 9 INJECTION INTRAMUSCULAR; INTRAVENOUS; SUBCUTANEOUS at 18:44

## 2021-10-26 RX ADMIN — ONDANSETRON 4 MILLIGRAM(S): 8 TABLET, FILM COATED ORAL at 18:44

## 2021-10-26 NOTE — ED PROVIDER NOTE - PROGRESS NOTE DETAILS
Fide LADD: alerted by rads resent TVUS w c/f torsion and hemoperitoneum Fide LADD: alerted by rads daniela TVUS w c/f torsion and hemoperitoneum, pt brought back to intake 5 from RW, monitor placed, 2nd IV placed, OB alerted, will see pt. Fide LADD: Pt assessed at beside. Pt resting comfortably, pain controlled, pt questions answered. Vital signs stable. discussed with gyn think more likely c/w rupt cyst than torsion, vitals stable pt pain controlled, ob rec rpt cbc at 11pm. Fide LADD: Pt assessed at beside. Pt resting comfortably, pain controlled, pt questions answered. Vital signs stable. Still w mild lower abdominal ttp, declining pain meds, OB updated. CT w findings c/w TVUS from same day. Pt signed out to my colleague Dr. Velásquez pending rpt labs and ob eval, dispo planning thereafter. [place]: GYN evaluated multiple times. They have low suspicion for torsion, believe pt has ruptured ovarian cyst. GYN offered pt surgery to evacuate hemoperitoneum however pt adamantly refusing surgery. Declining any other pain meds. Given strict return precautions. Pt advised to f/u with outpatient GYN within 2 days. Pt verbalizes agreement and understanding. VSS.

## 2021-10-26 NOTE — ED PROVIDER NOTE - PHYSICAL EXAMINATION
Fide LADD:  VITALS: Initial triage and subsequent vitals have been reviewed by me.  GEN APPEARANCE: WDWN, alert and cooperative, non-toxic appearing and in NAD  HEAD: Atraumatic, normocephalic   EYES: PERRLa, EOMI, vision grossly intact.   EARS: Gross hearing intact.   NOSE: No nasal discharge, no external evidence of epistaxis.   NECK: Supple  CV: RRR, S1S2, no c/r/m/g. No cyanosis or pallor. Extremities warm, well perfused. Cap refill <2 seconds. No bruits.   LUNGS: CTAB. No wheezing. No rales. No rhonchi. No diminished breath sounds.   ABDOMEN: suprapubic/RLQ ttp   MSK/EXT: Spine appears normal, no spine point tenderness. No CVA ttp. Normal muscular development. Pelvis stable. No obvious joint or bony deformity, no peripheral edema.   NEURO: Alert, follows commands. Weight bearing normal. Speech normal. Sensation and motor normal x4 extremities.   SKIN: Normal color for race, warm, dry and intact. No evidence of rash.  PSYCH: Normal mood and affect.   Exam (Female): External genitalia normal, no e/o bleeding, trauma. Cervix w/o CMT. Os is closed; no e/o discharge. No blood in vaginal vault. R adenxal ttp.   EXAM WITH: ED Tech

## 2021-10-26 NOTE — CONSULT NOTE ADULT - PROBLEM SELECTOR RECOMMENDATION 9
Plan  - Continue to monitor w/ serial VS   - Repeat CBC at 11pm to trend H/H   - f/u UA  - Continue Tylenol/Ibuprofen PRN  - Re-evaluate patient if she requires more pain medication than Tylenol/Motrin  - NPO/LR@125 until final dispo plan in place    d/w Dr. Sonali Gold PGY2 Plan  - Continue to monitor w/ serial VS   - Repeat CBC at 11pm to trend H/H   - f/u UA  - Continue Tylenol/Ibuprofen PRN  - Re-evaluate patient if she requires more pain medication than Tylenol/Motrin  - NPO/LR@125 until final dispo plan in place    d/w Dr. Sonali Gold PGY2     22y/o G0 LMP 9/28/21 c/o suprapubic pain that radiates to the RLQ. TVUS remarkable for hemoperitoneum with evidence of likely recently ruptured hemorrhagic cyst. Patient hemodynamically stable and clinical suspicion of torsion is low; offered patient laparoscopy for drainage of hemoperitoneum - patient declined. Patient to f/u w/ Gyn as an outpatient.  Virgil Lyon M.D.

## 2021-10-26 NOTE — ED ADULT TRIAGE NOTE - CHIEF COMPLAINT QUOTE
Pt brought in by EMS from home complaining of abdominal pain and nausea and diarrhea. Pt denies chest pain, sob, fever or chills.

## 2021-10-26 NOTE — ED ADULT NURSE NOTE - OBJECTIVE STATEMENT
pt received at intake rm 8 AAO x 3. pt with hx of frequent UTIs. pt c/o abdominal pain since this morning associated with n/d. pt denies sob, chest pain, vomiting, fevers, chills, cough. respirations even and unlabored. 22g iv placed to left wrist.

## 2021-10-26 NOTE — CONSULT NOTE ADULT - SUBJECTIVE AND OBJECTIVE BOX
SAMAN ROQUE  21y  Female [id number]    HPI: 22yo G0 LMP 9/28/21 p/w chief concern of suprapubic pain that radiates to the RLQ. Patient states that pain began today, is constant, rated 7/10 in severity. She took Tylenol at home and had no relief. She received Tylenol in the ED and reports that she had temporary relief, but it is now back to 7/10 in severity. She denies vaginal bleeding, dysuria, urinary frequency/urgency, hematuria, fevers, chills, chest pain, SOB, n/v/d, constipation.    Name of GYN Physician: Dr. Oriana Cortez    POB: G0    Pgyn: +PCOS w/ irregular periods, previously on OCPs, but self dc'ed them because she wants to attempt to conceive. Denies fibroids, endometriosis, STI's, Abnormal pap smears.    Home meds: Denies    Allergies  No Known Allergies    PAST MEDICAL & SURGICAL HISTORY:  Cerebral palsy  2006- b/l hip Sx w/ rods and screws placed    Social History: +occasional social alcohol use. +daily vape use. +occasional medical marijuana use. Denies other drug use.     Vital Signs Last 24 Hrs  T(C): 36.7 (26 Oct 2021 20:57), Max: 37 (26 Oct 2021 17:09)  T(F): 98 (26 Oct 2021 20:57), Max: 98.6 (26 Oct 2021 17:09)  HR: 56 (26 Oct 2021 20:57) (56 - 79)  BP: 98/56 (26 Oct 2021 20:57) (98/56 - 104/58)  BP(mean): 65 (26 Oct 2021 20:57) (65 - 65)  RR: 20 (26 Oct 2021 20:57) (18 - 20)  SpO2: 100% (26 Oct 2021 20:57) (99% - 100%)    Physical Exam:   General: sitting comfortably in bed, NAD, but appears uncomfortable when switching positions from laying to sitting  CV: RR S1S2 no m/r/g  Lungs: CTA b/l, good air flow b/l   Back: No CVA tenderness  Abd: Soft, non-distended, tender to palpation in the RLQ. No rebound or guarding.    : No bleeding on pad. External labia wnl. Bimanual exam with +cervical motion tenderness. Uterus wnl, adnexa non palpable b/l.  Cervix closed.  Speculum Exam: No active bleeding from os.  Physiologic discharge.    Ext: non-tender b/l, no edema     LABS:  bHCG: <5               13.4   15.26 )-----------( 224      ( 26 Oct 2021 18:53 )             43.1     10-26    139  |  108<H>  |  12  ----------------------------<  84  4.6   |  16<L>  |  0.56    Ca    8.9      26 Oct 2021 18:53    TPro  6.8  /  Alb  4.2  /  TBili  0.5  /  DBili  x   /  AST  21  /  ALT  22  /  AlkPhos  88  10-26    I&O's Detail          RADIOLOGY & ADDITIONAL STUDIES:  TVUS FINDINGS:    Uterus: 6.8 x 3.4 x 3.8 cm. Within normal limits.  Endometrium: 0.9 cm. Within normal limits.    Right ovary: 2.7 x 3.2 x 2.9 cm. Within normal limits.  Left ovary: 5.3 x 4.9 x 4.0 cm. The left ovary is  enlarged and displaced to the central pelvis. Central echogenic region may represent clot in a hemorrhagic cyst. There is adjacent hemoperitoneum. There is increased echogenicity of the left ovary.    Fluid: Hemoperitoneum.    IMPRESSION:  Sonographic findings  concerning for left ovarian torsion and/or hemorrhagic cyst Recommend stat GYN consult.

## 2021-10-26 NOTE — ED ADULT NURSE NOTE - CHIEF COMPLAINT
1) ESRD on HD  2 MBD of renal dx  3) Anemia of renal dx  4) Vol HTN  5) Permacath site infx    Spoke with infectious disease; will need permacath removed and holiday for 3 days  Well dialyzed at [place]; BUN down considerably;  Remove tunneled CVC; 3 day catheter holiday  Called vascular surgery resident [phone] for consult to remove cath  ID consult; following;  Called pulmonology consult for home O2; significant lung dx;  can wait until AM; good sats on NC    d/w Dr Moura 73y/o  Male with h/o ESRD on HD T/Th/Sat, CAD s/p PCI on recent admission, s/p CABG HTN, Pulm HTN, Lung [place]   Here with Permacath infection      Permacath infection  Hypoxia   ESRD on HD  CAD s/p PCI  Pulm HTN  Lung Ca    - Blood cultures pending  - Would remove permacath and culture tip  - Patient will need 3 day holiday and negative blood cultures prior to replacing the permacath  - Will Continue Vancomycin post HD  - Check level in am  - Trend fever  - Trend leukocytosis    Will Follow 74 year old male on HD for ESRD with recent placement of R IJ permacath for HD access who was febrile with leukocytosis after dialysis session 2 days ago. No armaan pus noted near catheter site with minimal erythema. Imp--Pt with evidence RT fibrosis, COPD  Plan--Nebs, abx for infected catheter.  Will defer systemic steroids in face of possible sepsis.  Will need home O2 most likely. 74y old Male with a past medical history significant for lung cancer s/p XRT, COPD, CAD, CABG, ESRD on HD, HTN presents with permacath infection. He is awaiting new permacath and AVF. He was noted to have worsening anemia during this admission. he has constipation and has intermittently noticed small amount of red blood with light brown stool over the last 3 weeks.   He likely has internal hemorrhoids causing minor bleeding when he is constipated but he has never had a colonoscopy       Anemia likely related to chronic inflammation  - ordered Iron studies, B12 and folate    Constipation   - start colace 200mg qhs  - colonoscopy when resp issues well controlled can be done inpt vs outpt 73 y/o male with hx of ESRD on HD T/Th/Sat, CAD s/p PCI on recent admission, HTN, Pulm HTN, Lung cancer s/p XRT in 06 Was at HD yesterday evening and noted to have low grade temp and purulent drainage from catheter insertion site with erythema. Patient completed his HD session and was sent to ED. In ED was noted to have WBC of 15, no shift, no fever. There was some scant drainage around catheter insertion site and erythema. Patient also noted to have hypoxia. Denies cough, CP, Chills. Admits to subjective fevers at home over past few days. No focal weakness.     Pt now with PNA and hemoptysis 74M with Chronic renal failure. Admitted with permacath site infection, requiring removal and re-insertion of a new temporary HD Cath. During the procedure today, the patient developed respratory distress, ulitimately requiring Bi[pap support. his CXr shows a moderate sized pleural effusion with PVC. Of note he is due for HD today. A/P:  73 y/o M PMHx of CAD (s/p CABG >20 years ago, recent CHAN to LCx on 12/06/18), HFpEF, Pulm HTN, ESRD on HD T/Th/Sat, Lung CA s/p XRT 2006, HTN, and HLD who was transferred from rehab on 12/23/18 due to low grade temp and purulent drainage from catheter insertion site. Now s/p new permacath placement, to undergo AVF on 01/03/18.     1. Satish-operative Cardiac Risk Stratification  - RCRI risk 11%  - Patient with elevated cardiac risk due to multiple co-morbidities  - Make sure HD is performed satish-operatively for HF management.   - No signs of ACS or anginal equivalent  - No active chest pain, evidence of ischemia, decompensated CHF, significant valvular abnormality, or unstable arrhythmia and therefore no absolute cardiac contraindication to the planned surgical procedure.    2. CAD  - S/P CHAN to LCx 12/06/18  - Continue ASA/Plavix  - Cont losartan, metoprolol, lipitor, and Imdur    3. HTN  - Well controlled on current regimen.     4. ESRD  - Infection of previous permacath site  - Blood Cx's negative x 5 days  - Continue vancomycin afebrile VSS awake and alert resting in bed in no acute distress, daughter at bedside.   patient states had mild scrotal discomfort earlier nothing at present, infrequent voiding since early december( ESRD ).  Recently had voided yesterday and today. noted some blood.     abdomen, soft mildly distended at present, no guarding, no rebound, no supra-pubic discomfort no palpable bladder distention.   right groin with dialysis catheter in place ( placed 12/31 ) in place stable.                 ( management per vascular team. )      :  descended testicles, scrotum with firm masses, non-tender at present, no edema appreciated.  penis, with out edema/swelling and stated voided some urine yesterday, and today.      LABS:      CBC: wbc=7.8          hg  =9.5          hct  =30.2          plts. =325    lytes= na133      k=4.4     cl=93   co2=26  bun =57  creat=4.7  glu  =123    UA/ micro  ph. - 6                 color - red         appearance- bloody           spec. grav.- 1.015                    RBC -  TNTC                    WBC - 3.5                   bact.- occasional                protein- 500                blood  - large                nitrate - negative            glu. quant.- negative          urobilinogen- negative                 ketone - negative                bilirubin- negative    cultures: ( urine )  pending report  culture ( blood ) 12/22 no growth  culture ( catheter ) + report 12/24 report noted and ID eval. treatment in progress ( see report for full results )       RADIOLOGY REPORTS: 12/31/18  CTscan +  catheter right groin; right renal cyst, bilateral renovascular calcifications. no obstructive uropathy.  Diffuse infiltrates in right middle, and right lower lobe. bilateral pleural effusion.     Sonogram scrotum: bilateral heterogeneous testes with out discrete focal mass.  vascularity normal.  epididymis unremarkable.  mildly thickened testicular scrotal wall,    **SEE WRITTEN REPORT FOR FULL EXPLANATION***    Impression:  right internal jugular infected perm-cath., ESRD, hematuria ( most likely secondary to renal disease ) UTI, non-tender testicles at present, no palpable masses , no acute surgical issues at present.    ( patient with electrolyte imbalance, lung cancer multiple medical issues. )   discuss with Surgeon present situation and continued care.  Plan:  continue present care and management as per medicine, Vascular, nephrology; will order urine cytology at present.   thank you for this consultation.      plan: The patient is a 21y Female complaining of  74 yr man, ESRD recently placed on  HD, HTN, Pulm HTN, CAD s/p PCI male admitted with infection from  [facility] site.

## 2021-10-26 NOTE — ED PROVIDER NOTE - OBJECTIVE STATEMENT
Fide LADD: 21F hx of CP,  no pmh no prior surg hx presents with a cc suprapubic lower abdominal pain  now moving to RLQ worsening prompting ED visit, nausea no vomiting no fevers, took APAP prior to ED arrival w/o much improvement. No fever. No vaginal discharge or bleeding. Thinks may be UTI but does not feel like prior denies dysuria, frequency. Denies /v/f/c/cp/sob. Denies headache, syncope, lightheadedness, dizziness. Denies chest palpitations, Denies edema. Denies dysuria, hematuria, BRBPR, tarry stools, diarrhea, constipation.

## 2021-10-26 NOTE — ED PROVIDER NOTE - PATIENT PORTAL LINK FT
You can access the FollowMyHealth Patient Portal offered by [facility] by registering at the following website: [email or web address]. By joining [employer]’s FollowMyHealth portal, you will also be able to view your health information using other applications (apps) compatible with our system.

## 2021-10-26 NOTE — ED PROVIDER NOTE - ATTENDING CONTRIBUTION TO CARE
I have personally performed a face to face medical and diagnostic evaluation of the patient. I have discussed with and reviewed the ACP's note and agree with the History, ROS, Physical Exam and MDM unless otherwise indicated. A brief summary of my personal evaluation and impression can be found below.    Fide LADD: Please see the HPI, ROS, PE and MDM as authored by me.

## 2021-10-26 NOTE — ED PROVIDER NOTE - CLINICAL SUMMARY MEDICAL DECISION MAKING FREE TEXT BOX
Fide LADD: Fide LADD: 21F hx of CP,  no pmh no prior surg hx presents with a cc suprapubic lower abdominal pain  now moving to RLQ worsening prompting ED visit, nausea no vomiting no fevers, took APAP prior to ED arrival w/o much improvement. No fever. No vaginal discharge or bleeding. Thinks may be UTI but does not feel like prior denies dysuria, frequency. exam vss non toxic diffuse lower abdominal ttp w pelvic findings as above ddx c/f cyst/torsion vs appy v uti plan to check labs ua ctap tvus surg/ob consult as indicated, reassess thereafter.

## 2021-10-26 NOTE — CONSULT NOTE ADULT - ASSESSMENT
22yo G0 LMP 9/28/21 p/w chief concern of suprapubic pain that radiates to the RLQ.    22yo G0 LMP 9/28/21 p/w chief concern of suprapubic pain that radiates to the RLQ. VS wnl, physical exam significant for CMT, and RLQ pain. TVUS shows hemoperitoneum with evidence of likely recently ruptured hemorrhagic cyst. Low suspicion for ovarian torsion, given absence of any adnexal cyst/structure still present on US.   22yo G0 LMP 9/28/21 p/w chief concern of suprapubic pain that radiates to the RLQ. VS wnl, physical exam significant for CMT, and RLQ pain. TVUS shows hemoperitoneum with evidence of likely recently ruptured hemorrhagic cyst. Low suspicion for ovarian torsion clinically.

## 2021-10-27 VITALS — HEART RATE: 61 BPM | DIASTOLIC BLOOD PRESSURE: 58 MMHG | OXYGEN SATURATION: 100 % | SYSTOLIC BLOOD PRESSURE: 91 MMHG

## 2021-10-27 LAB
APTT BLD: 33.3 SEC — SIGNIFICANT CHANGE UP (ref 27–36.3)
BASOPHILS # BLD AUTO: 0.03 K/UL — SIGNIFICANT CHANGE UP (ref 0–0.2)
BASOPHILS NFR BLD AUTO: 0.2 % — SIGNIFICANT CHANGE UP (ref 0–2)
EOSINOPHIL # BLD AUTO: 0.06 K/UL — SIGNIFICANT CHANGE UP (ref 0–0.5)
EOSINOPHIL NFR BLD AUTO: 0.5 % — SIGNIFICANT CHANGE UP (ref 0–6)
HCT VFR BLD CALC: 38 % — SIGNIFICANT CHANGE UP (ref 34.5–45)
HGB BLD-MCNC: 12.5 G/DL — SIGNIFICANT CHANGE UP (ref 11.5–15.5)
IANC: 8.85 K/UL — HIGH (ref 1.5–8.5)
IMM GRANULOCYTES NFR BLD AUTO: 0.3 % — SIGNIFICANT CHANGE UP (ref 0–1.5)
INR BLD: 1.18 RATIO — HIGH (ref 0.88–1.16)
LYMPHOCYTES # BLD AUTO: 25 % — SIGNIFICANT CHANGE UP (ref 13–44)
LYMPHOCYTES # BLD AUTO: 3.25 K/UL — SIGNIFICANT CHANGE UP (ref 1–3.3)
MCHC RBC-ENTMCNC: 28.9 PG — SIGNIFICANT CHANGE UP (ref 27–34)
MCHC RBC-ENTMCNC: 32.9 GM/DL — SIGNIFICANT CHANGE UP (ref 32–36)
MCV RBC AUTO: 88 FL — SIGNIFICANT CHANGE UP (ref 80–100)
MONOCYTES # BLD AUTO: 0.76 K/UL — SIGNIFICANT CHANGE UP (ref 0–0.9)
MONOCYTES NFR BLD AUTO: 5.9 % — SIGNIFICANT CHANGE UP (ref 2–14)
NEUTROPHILS # BLD AUTO: 8.85 K/UL — HIGH (ref 1.8–7.4)
NEUTROPHILS NFR BLD AUTO: 68.1 % — SIGNIFICANT CHANGE UP (ref 43–77)
NRBC # BLD: 0 /100 WBCS — SIGNIFICANT CHANGE UP
NRBC # FLD: 0 K/UL — SIGNIFICANT CHANGE UP
PLATELET # BLD AUTO: 217 K/UL — SIGNIFICANT CHANGE UP (ref 150–400)
PROTHROM AB SERPL-ACNC: 13.3 SEC — SIGNIFICANT CHANGE UP (ref 10.6–13.6)
RBC # BLD: 4.32 M/UL — SIGNIFICANT CHANGE UP (ref 3.8–5.2)
RBC # FLD: 14.7 % — HIGH (ref 10.3–14.5)
WBC # BLD: 12.99 K/UL — HIGH (ref 3.8–10.5)
WBC # FLD AUTO: 12.99 K/UL — HIGH (ref 3.8–10.5)

## 2021-10-27 RX ORDER — KETOROLAC TROMETHAMINE 30 MG/ML
30 SYRINGE (ML) INJECTION ONCE
Refills: 0 | Status: DISCONTINUED | OUTPATIENT
Start: 2021-10-27 | End: 2021-10-27

## 2021-10-27 RX ADMIN — Medication 30 MILLIGRAM(S): at 02:20

## 2021-10-28 LAB
CULTURE RESULTS: SIGNIFICANT CHANGE UP
SPECIMEN SOURCE: SIGNIFICANT CHANGE UP

## 2022-02-17 ENCOUNTER — EMERGENCY (EMERGENCY)
Facility: HOSPITAL | Age: 22
LOS: 1 days | Discharge: HOME | End: 2022-02-17
Attending: STUDENT IN AN ORGANIZED HEALTH CARE EDUCATION/TRAINING PROGRAM | Admitting: STUDENT IN AN ORGANIZED HEALTH CARE EDUCATION/TRAINING PROGRAM
Payer: MEDICAID

## 2022-02-17 VITALS
DIASTOLIC BLOOD PRESSURE: 59 MMHG | RESPIRATION RATE: 17 BRPM | HEART RATE: 62 BPM | OXYGEN SATURATION: 100 % | TEMPERATURE: 98 F | SYSTOLIC BLOOD PRESSURE: 96 MMHG

## 2022-02-17 VITALS
DIASTOLIC BLOOD PRESSURE: 70 MMHG | TEMPERATURE: 98 F | RESPIRATION RATE: 18 BRPM | HEART RATE: 82 BPM | OXYGEN SATURATION: 97 % | SYSTOLIC BLOOD PRESSURE: 107 MMHG

## 2022-02-17 LAB
ALBUMIN SERPL ELPH-MCNC: 4.3 G/DL — SIGNIFICANT CHANGE UP (ref 3.3–5)
ALP SERPL-CCNC: 80 U/L — SIGNIFICANT CHANGE UP (ref 40–120)
ALT FLD-CCNC: 24 U/L — SIGNIFICANT CHANGE UP (ref 4–33)
ANION GAP SERPL CALC-SCNC: 13 MMOL/L — SIGNIFICANT CHANGE UP (ref 7–14)
APPEARANCE UR: CLEAR — SIGNIFICANT CHANGE UP
AST SERPL-CCNC: 36 U/L — HIGH (ref 4–32)
BACTERIA # UR AUTO: ABNORMAL
BASOPHILS # BLD AUTO: 0.03 K/UL — SIGNIFICANT CHANGE UP (ref 0–0.2)
BASOPHILS NFR BLD AUTO: 0.4 % — SIGNIFICANT CHANGE UP (ref 0–2)
BILIRUB SERPL-MCNC: 0.3 MG/DL — SIGNIFICANT CHANGE UP (ref 0.2–1.2)
BILIRUB UR-MCNC: NEGATIVE — SIGNIFICANT CHANGE UP
BUN SERPL-MCNC: 12 MG/DL — SIGNIFICANT CHANGE UP (ref 7–23)
CALCIUM SERPL-MCNC: 9.1 MG/DL — SIGNIFICANT CHANGE UP (ref 8.4–10.5)
CHLORIDE SERPL-SCNC: 105 MMOL/L — SIGNIFICANT CHANGE UP (ref 98–107)
CO2 SERPL-SCNC: 20 MMOL/L — LOW (ref 22–31)
COLOR SPEC: SIGNIFICANT CHANGE UP
CREAT SERPL-MCNC: 0.57 MG/DL — SIGNIFICANT CHANGE UP (ref 0.5–1.3)
DIFF PNL FLD: ABNORMAL
EOSINOPHIL # BLD AUTO: 0.08 K/UL — SIGNIFICANT CHANGE UP (ref 0–0.5)
EOSINOPHIL NFR BLD AUTO: 1.1 % — SIGNIFICANT CHANGE UP (ref 0–6)
EPI CELLS # UR: 4 /HPF — SIGNIFICANT CHANGE UP (ref 0–5)
GLUCOSE SERPL-MCNC: 87 MG/DL — SIGNIFICANT CHANGE UP (ref 70–99)
GLUCOSE UR QL: NEGATIVE — SIGNIFICANT CHANGE UP
HCG SERPL-ACNC: <5 MIU/ML — SIGNIFICANT CHANGE UP
HCT VFR BLD CALC: 44.5 % — SIGNIFICANT CHANGE UP (ref 34.5–45)
HGB BLD-MCNC: 13.9 G/DL — SIGNIFICANT CHANGE UP (ref 11.5–15.5)
IANC: 5.15 K/UL — SIGNIFICANT CHANGE UP (ref 1.5–8.5)
IMM GRANULOCYTES NFR BLD AUTO: 0.4 % — SIGNIFICANT CHANGE UP (ref 0–1.5)
KETONES UR-MCNC: NEGATIVE — SIGNIFICANT CHANGE UP
LEUKOCYTE ESTERASE UR-ACNC: ABNORMAL
LYMPHOCYTES # BLD AUTO: 1.65 K/UL — SIGNIFICANT CHANGE UP (ref 1–3.3)
LYMPHOCYTES # BLD AUTO: 21.9 % — SIGNIFICANT CHANGE UP (ref 13–44)
MCHC RBC-ENTMCNC: 28.8 PG — SIGNIFICANT CHANGE UP (ref 27–34)
MCHC RBC-ENTMCNC: 31.2 GM/DL — LOW (ref 32–36)
MCV RBC AUTO: 92.1 FL — SIGNIFICANT CHANGE UP (ref 80–100)
MONOCYTES # BLD AUTO: 0.59 K/UL — SIGNIFICANT CHANGE UP (ref 0–0.9)
MONOCYTES NFR BLD AUTO: 7.8 % — SIGNIFICANT CHANGE UP (ref 2–14)
NEUTROPHILS # BLD AUTO: 5.15 K/UL — SIGNIFICANT CHANGE UP (ref 1.8–7.4)
NEUTROPHILS NFR BLD AUTO: 68.4 % — SIGNIFICANT CHANGE UP (ref 43–77)
NITRITE UR-MCNC: NEGATIVE — SIGNIFICANT CHANGE UP
NRBC # BLD: 0 /100 WBCS — SIGNIFICANT CHANGE UP
NRBC # FLD: 0 K/UL — SIGNIFICANT CHANGE UP
PH UR: 7 — SIGNIFICANT CHANGE UP (ref 5–8)
PLATELET # BLD AUTO: 251 K/UL — SIGNIFICANT CHANGE UP (ref 150–400)
POTASSIUM SERPL-MCNC: 4 MMOL/L — SIGNIFICANT CHANGE UP (ref 3.5–5.3)
POTASSIUM SERPL-MCNC: 5.7 MMOL/L — HIGH (ref 3.5–5.3)
POTASSIUM SERPL-SCNC: 4 MMOL/L — SIGNIFICANT CHANGE UP (ref 3.5–5.3)
POTASSIUM SERPL-SCNC: 5.7 MMOL/L — HIGH (ref 3.5–5.3)
PROT SERPL-MCNC: 7 G/DL — SIGNIFICANT CHANGE UP (ref 6–8.3)
PROT UR-MCNC: NEGATIVE — SIGNIFICANT CHANGE UP
RBC # BLD: 4.83 M/UL — SIGNIFICANT CHANGE UP (ref 3.8–5.2)
RBC # FLD: 15.5 % — HIGH (ref 10.3–14.5)
RBC CASTS # UR COMP ASSIST: 5 /HPF — HIGH (ref 0–4)
SODIUM SERPL-SCNC: 138 MMOL/L — SIGNIFICANT CHANGE UP (ref 135–145)
SP GR SPEC: 1.02 — SIGNIFICANT CHANGE UP (ref 1–1.05)
UROBILINOGEN FLD QL: SIGNIFICANT CHANGE UP
WBC # BLD: 7.53 K/UL — SIGNIFICANT CHANGE UP (ref 3.8–10.5)
WBC # FLD AUTO: 7.53 K/UL — SIGNIFICANT CHANGE UP (ref 3.8–10.5)
WBC UR QL: 4 /HPF — SIGNIFICANT CHANGE UP (ref 0–5)

## 2022-02-17 PROCEDURE — 71046 X-RAY EXAM CHEST 2 VIEWS: CPT | Mod: 26

## 2022-02-17 PROCEDURE — 99285 EMERGENCY DEPT VISIT HI MDM: CPT

## 2022-02-17 PROCEDURE — 76830 TRANSVAGINAL US NON-OB: CPT | Mod: 26

## 2022-02-17 RX ORDER — CEFPODOXIME PROXETIL 100 MG
1 TABLET ORAL
Qty: 14 | Refills: 0
Start: 2022-02-17 | End: 2022-02-23

## 2022-02-17 RX ORDER — SODIUM CHLORIDE 9 MG/ML
1000 INJECTION INTRAMUSCULAR; INTRAVENOUS; SUBCUTANEOUS ONCE
Refills: 0 | Status: COMPLETED | OUTPATIENT
Start: 2022-02-17 | End: 2022-02-17

## 2022-02-17 RX ADMIN — SODIUM CHLORIDE 1000 MILLILITER(S): 9 INJECTION INTRAMUSCULAR; INTRAVENOUS; SUBCUTANEOUS at 13:22

## 2022-02-17 NOTE — ED PROVIDER NOTE - PHYSICAL EXAMINATION
GENERAL: no acute distress, non-toxic appearing  HEENT: PERRLA, EOMI, normal conjunctiva, oral mucosa moist  CARDIAC: regular rate and rhythm  PULM: clear to ascultation bilaterally, no crackles, rales, rhonchi, or wheezing  GI: abdomen nondistended, soft, nontender, no guarding or rebound tenderness  : no suprapubic tenderness  NEURO: alert and oriented x 3, normal speech, no gross neurologic deficit  MSK: no visible deformities  SKIN: no visible rashes, dry, well-perfused  PSYCH: appropriate mood and affect

## 2022-02-17 NOTE — ED PROVIDER NOTE - PROGRESS NOTE DETAILS
Waiting for Potassium to result. Patient with UTI, will send home with abx. TVUS within normal. No acute findings. Repeat potassium normal. Will d/c with abx for UTI.

## 2022-02-17 NOTE — ED PROVIDER NOTE - ATTENDING CONTRIBUTION TO CARE
I have personally performed a face to face medical and diagnostic evaluation of the patient. I have discussed with and reviewed the Resident's note and agree with the History, ROS, Physical Exam and MDM unless otherwise indicated. A brief summary of my personal evaluation and impression can be found below.    21F hx of CP R rupt ovarian cyst panic attack presents with x2 months intermittent RLQ pain a/w mild SOB and CP in setting of panic attacks. mild dysuria. also notes mild L lateral rib pain, currently no OCPS. no prior hx of of dvt pe, no new vaginal discharge or bleeding.     All other ROS negative, except as above and as per HPI and ROS section.    VITALS: Initial triage and subsequent vitals have been reviewed by me.  GEN APPEARANCE: WDWN, alert, non-toxic, NAD  HEAD: Atraumatic.  EYES: PERRLa, EOMI, vision grossly intact.   NECK: Supple  CV: RRR, S1S2, no c/r/m/g. Cap refill <2 seconds. No bruits.   LUNGS: CTAB. No abnormal breath sounds.  ABDOMEN: Soft, NTND. No guarding or rebound.   MSK/EXT: No spinal or extremity point tenderness. No CVA ttp. Pelvis stable. No peripheral edema.  NEURO: Alert, follows commands. Weight bearing normal. Speech normal. Sensation and motor normal x4 extremities.   SKIN: Warm, dry and intact. No rash.  PSYCH: Appropriate    Plan/MDM:   21F hx of CP R rupt ovarian cyst panic attack presents with x2 months intermittent RLQ pain a/w mild SOB and CP in setting of panic attacks. mild dysuria. also notes mild L lateral rib pain, currently no OCPS. no prior hx of of dvt pe  exam vss non toxic PE as above ddx low c/f new acute intraabdominal surgical or pelvic pathology, low c/f pe plan to check labs cxr urine ekg tvus meds as needed and reassess.

## 2022-02-17 NOTE — ED PROVIDER NOTE - PATIENT PORTAL LINK FT
You can access the FollowMyHealth Patient Portal offered by [facility] by registering at the following website: [email or web address]. By joining [employer]’s FollowMyHealth portal, you will also be able to view your health information using other applications (apps) compatible with our system.

## 2022-02-17 NOTE — ED ADULT NURSE NOTE - OBJECTIVE STATEMENT
received pt in bed  A and OX 3 in NAD resting comfortably, c.o left sided c/p onset " few weeks" ago worse for the past few days, denies SOB, ELKINS, lung sounds clear B/L communicates with full and complete sentences abd soft on distended non tender, no pedal edema noted, IV initiated labs drawn and sent.

## 2022-02-17 NOTE — ED PROVIDER NOTE - CLINICAL SUMMARY MEDICAL DECISION MAKING FREE TEXT BOX
Patient is a 21y F PMHx CP, right ruptured ovarian cyst (10/2021), panic attacks, presenting today with 2 months intermittent RLQ pain. R/o cyst vs ectopic. Will get TVUS. Consider UTI, will get UA. Hcg. Fluids, pain control, reassess.

## 2022-02-17 NOTE — ED PROVIDER NOTE - OBJECTIVE STATEMENT
Patient is a 21y F PMHx CP, right ruptured ovarian cyst (10/2021), panic attacks, presenting today with 2 months intermittent RLQ pain. Last ob/gyn appt 11/2021 which showed cyst healed well. CP and SOB with panic attacks, but no other time. Has had dysuria. Denies n/v/d, fevers.

## 2022-02-17 NOTE — ED PROVIDER NOTE - NSFOLLOWUPCLINICS_GEN_ALL_ED_FT
[facility]  General Internal Medicine  [address] [place] [address]  Phone: [phone]  Fax:     [facility] [place]  Internal Medicine  [address] [place] [address]  Phone: [phone]  Fax: [phone]    [facility]  Internal Medicine  [address] [place] [address]  Phone: [phone]  Fax: [phone]

## 2022-02-17 NOTE — ED PROVIDER NOTE - NSFOLLOWUPINSTRUCTIONS_ED_ALL_ED_FT
You were evaluated in the Emergency Department for abdominal pain.  You were evaluated and examined by a physician, and you had labs, transvaginal ultrasound, urine test.     Based on your evaluation: You have a urinary tract infection    There are no signs of emergency conditions requiring admission to the hospital on today's workup.  Based on the evaluation, a presumptive diagnosis was made, however, further evaluation may be required by your primary care physician or a specialist for a more definitive diagnosis.  Therefore, please follow-up as directed or return to the Emergency Department if your symptoms change or worsen.    We recommend that you:  1. See your primary care physician within the next 72 hours for follow up.  Bring a copy of your discharge paperwork (including any test results) to your doctor.  2. Take antibiotics as prescribed      *** Return immediately if you have worsening symptoms, sever abdominal pain, fevers, worsening back pain, or any other new/concerning symptoms. ***      Acute Abdominal Pain    WHAT YOU NEED TO KNOW:  The cause of your abdominal pain may not be found. If a cause is found, treatment will depend on what the cause is.    DISCHARGE INSTRUCTIONS:    Seek care immediately if:  You vomit blood or cannot stop vomiting.  You have blood in your bowel movement or it looks like tar.  You have bleeding from your rectum.  Your abdomen is larger than usual, more painful, and hard.  You have severe pain in your abdomen.  You stop passing gas and having bowel movements.  You feel weak, dizzy, or faint.  Contact your healthcare provider if:  You have a fever.  You have new signs and symptoms.  Your symptoms do not get better with treatment.  You have questions or concerns about your condition or care.  Medicines may be given to decrease pain, treat an infection, and manage your symptoms. Take your medicine as directed. Call your healthcare provider if you think your medicine is not helping or if you have side effects. Tell him if you are allergic to any medicine. Keep a list of the medicines, vitamins, and herbs you take. Include the amounts, and when and why you take them. Bring the list or the pill bottles to follow-up visits. Carry your medicine list with you in case of an emergency.    Manage your symptoms:    Apply heat on your abdomen for 20 to 30 minutes every 2 hours for as many days as directed. Heat helps decrease pain and muscle spasms.  Manage your stress. Stress may cause abdominal pain. Your healthcare provider may recommend relaxation techniques and deep breathing exercises to help decrease your stress. Your healthcare provider may recommend you talk to someone about your stress or anxiety, such as a counselor or a trusted friend. Get plenty of sleep and exercise regularly.  Limit or do not drink alcohol. Alcohol can make your abdominal pain worse. Ask your healthcare provider if it is safe for you to drink alcohol. Also ask how much is safe for you to drink.  Do not smoke. Nicotine and other chemicals in cigarettes can damage your esophagus and stomach. Ask your healthcare provider for information if you currently smoke and need help to quit. E-cigarettes or smokeless tobacco still contain nicotine. Talk to your healthcare provider before you use these products.  Make changes to the food you eat as directed: Do not eat foods that cause abdominal pain or other symptoms. Eat small meals more often.  Eat more high-fiber foods if you are constipated. High-fiber foods include fruits, vegetables, whole-grain foods, and legumes.  Do not eat foods that cause gas if you have bloating. Examples include broccoli, cabbage, and cauliflower. Do not drink soda or carbonated drinks, because these may also cause gas.  Do not eat foods or drinks that contain sorbitol or fructose if you have diarrhea and bloating. Some examples are fruit juices, candy, jelly, and sugar-free gum.  Do not eat high-fat foods, such as fried foods, cheeseburgers, hot dogs, and desserts.  Limit or do not drink caffeine. Caffeine may make symptoms, such as heart burn or nausea, worse.  Drink plenty of liquids to prevent dehydration from diarrhea or vomiting. Ask your healthcare provider how much liquid to drink each day and which liquids are best for you.  Follow up with your healthcare provider as directed: Write down your questions so you remember to ask them during your visits.  Eric cat    [facility]  Urology  [address], [address] & 4th [place] [address]  Phone: [phone]    Urinary Tract Infection in Women    WHAT YOU NEED TO KNOW:    A urinary tract infection (UTI) is caused by bacteria that get inside your urinary tract. Most bacteria that enter your urinary tract come out when you urinate. If the bacteria stay in your urinary tract, you may get an infection. Your urinary tract includes your kidneys, ureters, bladder, and urethra. Urine is made in your kidneys, and it flows from the ureters to the bladder. Urine leaves the bladder through the urethra. A UTI is more common in your lower urinary tract, which includes your bladder and urethra.  Female Urinary System    DISCHARGE INSTRUCTIONS:    Seek care immediately if:    You are urinating very little or not at all.    You have a high fever with shaking chills.    You have side or back pain that gets worse.  Call your doctor if:    You have a fever.    You do not feel better after 2 days of taking antibiotics.    You are vomiting.    You have questions or concerns about your condition or care.  Medicines:    Antibiotics help fight a bacterial infection. If you have UTIs often (called recurrent UTIs), you may be given antibiotics to take regularly. You will be given directions for when and how to use antibiotics. The goal is to prevent UTIs but not cause antibiotic resistance by using antibiotics too often.    Medicines may be given to decrease pain and burning when you urinate. They will also help decrease the feeling that you need to urinate often. These medicines will make your urine orange or red.    Take your medicine as directed. Contact your healthcare provider if you think your medicine is not helping or if you have side effects. Tell him or her if you are allergic to any medicine. Keep a list of the medicines, vitamins, and herbs you take. Include the amounts, and when and why you take them. Bring the list or the pill bottles to follow-up visits. Carry your medicine list with you in case of an emergency.  Follow up with your healthcare provider as directed: Write down your questions so you remember to ask them during your visits.    Prevent another UTI:    Empty your bladder often. Urinate and empty your bladder as soon as you feel the need. Do not hold your urine for long periods of time.    Wipe from front to back after you urinate or have a bowel movement. This will help prevent germs from getting into your urinary tract through your urethra.    Drink liquids as directed. Ask how much liquid to drink each day and which liquids are best for you. You may need to drink more liquids than usual to help flush out the bacteria. Do not drink alcohol, caffeine, or citrus juices. These can irritate your bladder and increase your symptoms. Your healthcare provider may recommend cranberry juice to help prevent a UTI.    Urinate after you have sex. This can help flush out bacteria passed during sex.    Do not douche or use feminine deodorants. These can change the chemical balance in your vagina.    Change sanitary pads or tampons often. This will help prevent germs from getting into your urinary tract.    Talk to your healthcare provider about your birth control method. You may need to change your method if it is increasing your risk for UTIs.    Wear cotton underwear and clothes that are loose. Tight pants and nylon underwear can trap moisture and cause bacteria to grow.    Vaginal estrogen may be recommended. This medicine helps prevent UTIs in women who have gone through menopause or are in rick-menopause.    Do pelvic muscle exercises often. Pelvic muscle exercises may help you start and stop urinating. Strong pelvic muscles may help you empty your bladder easier. Squeeze these muscles tightly for 5 seconds like you are trying to hold back urine. Then relax for 5 seconds. Gradually work up to squeezing for 10 seconds. Do 3 sets of 15 repetitions a day, or as directed.

## 2022-02-18 LAB
CULTURE RESULTS: SIGNIFICANT CHANGE UP
SPECIMEN SOURCE: SIGNIFICANT CHANGE UP

## 2024-12-25 PROBLEM — F10.90 ALCOHOL USE: Status: ACTIVE | Noted: 2020-07-01

## 2025-05-30 ENCOUNTER — APPOINTMENT (OUTPATIENT)
Dept: FAMILY MEDICINE | Facility: CLINIC | Age: 25
End: 2025-05-30

## 2025-07-31 ENCOUNTER — APPOINTMENT (OUTPATIENT)
Dept: INTERNAL MEDICINE | Facility: CLINIC | Age: 25
End: 2025-07-31